# Patient Record
Sex: MALE | Race: WHITE | NOT HISPANIC OR LATINO | Employment: FULL TIME | URBAN - METROPOLITAN AREA
[De-identification: names, ages, dates, MRNs, and addresses within clinical notes are randomized per-mention and may not be internally consistent; named-entity substitution may affect disease eponyms.]

---

## 2018-10-17 ENCOUNTER — HISTORICAL (OUTPATIENT)
Dept: ADMINISTRATIVE | Facility: HOSPITAL | Age: 38
End: 2018-10-17

## 2018-10-17 LAB — H PYLORI AB SER IA-ACNC: NEGATIVE

## 2019-05-17 ENCOUNTER — HISTORICAL (OUTPATIENT)
Dept: ADMINISTRATIVE | Facility: HOSPITAL | Age: 39
End: 2019-05-17

## 2019-05-17 LAB
ABS NEUT (OLG): 4.1 X10(3)/MCL (ref 2.1–9.2)
ALBUMIN SERPL-MCNC: 4.3 GM/DL (ref 3.4–5)
ALBUMIN/GLOB SERPL: 1.54 {RATIO} (ref 1.5–2.5)
ALP SERPL-CCNC: 82 UNIT/L (ref 38–126)
ALT SERPL-CCNC: 53 UNIT/L (ref 7–52)
AST SERPL-CCNC: 35 UNIT/L (ref 15–37)
BILIRUB SERPL-MCNC: 0.8 MG/DL (ref 0.2–1)
BILIRUBIN DIRECT+TOT PNL SERPL-MCNC: 0.1 MG/DL (ref 0–0.5)
BILIRUBIN DIRECT+TOT PNL SERPL-MCNC: 0.7 MG/DL
BUN SERPL-MCNC: 10 MG/DL (ref 7–18)
CALCIUM SERPL-MCNC: 8.9 MG/DL (ref 8.5–10)
CHLORIDE SERPL-SCNC: 103 MMOL/L (ref 98–107)
CHOLEST SERPL-MCNC: 221 MG/DL (ref 0–200)
CHOLEST/HDLC SERPL: 4.2 {RATIO}
CO2 SERPL-SCNC: 27 MMOL/L (ref 21–32)
CREAT SERPL-MCNC: 0.87 MG/DL (ref 0.6–1.3)
ERYTHROCYTE [DISTWIDTH] IN BLOOD BY AUTOMATED COUNT: 13 % (ref 11.5–17)
GLOBULIN SER-MCNC: 2.8 GM/DL (ref 1.2–3)
GLUCOSE SERPL-MCNC: 100 MG/DL (ref 74–106)
HCT VFR BLD AUTO: 43.9 % (ref 42–52)
HDLC SERPL-MCNC: 52 MG/DL (ref 35–60)
HGB BLD-MCNC: 15 GM/DL (ref 14–18)
LDLC SERPL CALC-MCNC: 152 MG/DL (ref 0–129)
LYMPHOCYTES # BLD AUTO: 2.5 X10(3)/MCL (ref 0.6–3.4)
LYMPHOCYTES NFR BLD AUTO: 35.1 % (ref 13–40)
MCH RBC QN AUTO: 29.6 PG (ref 27–31.2)
MCHC RBC AUTO-ENTMCNC: 34 GM/DL (ref 32–36)
MCV RBC AUTO: 87 FL (ref 80–94)
MONOCYTES # BLD AUTO: 0.5 X10(3)/MCL (ref 0.1–1.3)
MONOCYTES NFR BLD AUTO: 6.4 % (ref 0.1–24)
NEUTROPHILS NFR BLD AUTO: 58.5 % (ref 47–80)
PLATELET # BLD AUTO: 232 X10(3)/MCL (ref 130–400)
PMV BLD AUTO: 9.8 FL (ref 9.4–12.4)
POTASSIUM SERPL-SCNC: 4.3 MMOL/L (ref 3.5–5.1)
PROT SERPL-MCNC: 7.1 GM/DL (ref 6.4–8.2)
RBC # BLD AUTO: 5.07 X10(6)/MCL (ref 4.7–6.1)
SODIUM SERPL-SCNC: 137 MMOL/L (ref 136–145)
TRIGL SERPL-MCNC: 116 MG/DL (ref 30–150)
TSH SERPL-ACNC: 1.01 MIU/ML (ref 0.35–4.94)
VLDLC SERPL CALC-MCNC: 23.2 MG/DL
WBC # SPEC AUTO: 7.1 X10(3)/MCL (ref 4.5–11.5)

## 2020-01-16 ENCOUNTER — HISTORICAL (OUTPATIENT)
Dept: ADMINISTRATIVE | Facility: HOSPITAL | Age: 40
End: 2020-01-16

## 2020-06-10 ENCOUNTER — HISTORICAL (OUTPATIENT)
Dept: ADMINISTRATIVE | Facility: HOSPITAL | Age: 40
End: 2020-06-10

## 2020-06-10 LAB
ABS NEUT (OLG): 3.7 X10(3)/MCL (ref 2.1–9.2)
ALBUMIN SERPL-MCNC: 4.5 GM/DL (ref 3.4–5)
ALBUMIN/GLOB SERPL: 1.55 {RATIO} (ref 1.5–2.5)
ALP SERPL-CCNC: 87 UNIT/L (ref 38–126)
ALT SERPL-CCNC: 25 UNIT/L (ref 7–52)
AST SERPL-CCNC: 20 UNIT/L (ref 15–37)
BILIRUB SERPL-MCNC: 0.6 MG/DL (ref 0.2–1)
BILIRUBIN DIRECT+TOT PNL SERPL-MCNC: 0.1 MG/DL (ref 0–0.5)
BILIRUBIN DIRECT+TOT PNL SERPL-MCNC: 0.5 MG/DL
BUN SERPL-MCNC: 11 MG/DL (ref 7–18)
CALCIUM SERPL-MCNC: 9.4 MG/DL (ref 8.5–10)
CHLORIDE SERPL-SCNC: 103 MMOL/L (ref 98–107)
CHOLEST SERPL-MCNC: 228 MG/DL (ref 0–200)
CHOLEST/HDLC SERPL: 5 {RATIO}
CO2 SERPL-SCNC: 27 MMOL/L (ref 21–32)
CREAT SERPL-MCNC: 0.84 MG/DL (ref 0.6–1.3)
ERYTHROCYTE [DISTWIDTH] IN BLOOD BY AUTOMATED COUNT: 12.4 % (ref 11.5–17)
GLOBULIN SER-MCNC: 2.9 GM/DL (ref 1.2–3)
GLUCOSE SERPL-MCNC: 97 MG/DL (ref 74–106)
HCT VFR BLD AUTO: 45.1 % (ref 42–52)
HDLC SERPL-MCNC: 46 MG/DL (ref 35–60)
HGB BLD-MCNC: 15.1 GM/DL (ref 14–18)
LDLC SERPL CALC-MCNC: 151 MG/DL (ref 0–129)
LYMPHOCYTES # BLD AUTO: 2.4 X10(3)/MCL (ref 0.6–3.4)
LYMPHOCYTES NFR BLD AUTO: 35.8 % (ref 13–40)
MCH RBC QN AUTO: 29 PG (ref 27–31.2)
MCHC RBC AUTO-ENTMCNC: 34 GM/DL (ref 32–36)
MCV RBC AUTO: 87 FL (ref 80–94)
MONOCYTES # BLD AUTO: 0.5 X10(3)/MCL (ref 0.1–1.3)
MONOCYTES NFR BLD AUTO: 7.5 % (ref 0.1–24)
NEUTROPHILS NFR BLD AUTO: 56.7 % (ref 47–80)
PLATELET # BLD AUTO: 274 X10(3)/MCL (ref 130–400)
PMV BLD AUTO: 9.7 FL (ref 9.4–12.4)
POTASSIUM SERPL-SCNC: 4.4 MMOL/L (ref 3.5–5.1)
PROT SERPL-MCNC: 7.4 GM/DL (ref 6.4–8.2)
RBC # BLD AUTO: 5.21 X10(6)/MCL (ref 4.7–6.1)
SODIUM SERPL-SCNC: 138 MMOL/L (ref 136–145)
TRIGL SERPL-MCNC: 151 MG/DL (ref 30–150)
TSH SERPL-ACNC: 0.85 MIU/ML (ref 0.35–4.94)
VLDLC SERPL CALC-MCNC: 30.2 MG/DL
WBC # SPEC AUTO: 6.6 X10(3)/MCL (ref 4.5–11.5)

## 2021-06-16 ENCOUNTER — HISTORICAL (OUTPATIENT)
Dept: ADMINISTRATIVE | Facility: HOSPITAL | Age: 41
End: 2021-06-16

## 2021-06-16 LAB
ABS NEUT (OLG): 3.9 X10(3)/MCL (ref 2.1–9.2)
ALBUMIN SERPL-MCNC: 4.3 GM/DL (ref 3.4–5)
ALBUMIN/GLOB SERPL: 1.48 {RATIO} (ref 1.5–2.5)
ALP SERPL-CCNC: 79 UNIT/L (ref 38–126)
ALT SERPL-CCNC: 23 UNIT/L (ref 7–52)
AST SERPL-CCNC: 21 UNIT/L (ref 15–37)
BILIRUB SERPL-MCNC: 0.6 MG/DL (ref 0.2–1)
BILIRUBIN DIRECT+TOT PNL SERPL-MCNC: 0.1 MG/DL (ref 0–0.5)
BILIRUBIN DIRECT+TOT PNL SERPL-MCNC: 0.5 MG/DL
BUN SERPL-MCNC: 9 MG/DL (ref 7–18)
CALCIUM SERPL-MCNC: 9.3 MG/DL (ref 8.5–10.1)
CHLORIDE SERPL-SCNC: 100 MMOL/L (ref 98–107)
CHOLEST SERPL-MCNC: 208 MG/DL (ref 0–200)
CHOLEST/HDLC SERPL: 4.2 {RATIO}
CO2 SERPL-SCNC: 28 MMOL/L (ref 21–32)
CREAT SERPL-MCNC: 0.84 MG/DL (ref 0.6–1.3)
ERYTHROCYTE [DISTWIDTH] IN BLOOD BY AUTOMATED COUNT: 12.2 % (ref 11.5–17)
GLOBULIN SER-MCNC: 2.9 GM/DL (ref 1.2–3)
GLUCOSE SERPL-MCNC: 92 MG/DL (ref 74–106)
HCT VFR BLD AUTO: 44.2 % (ref 42–52)
HDLC SERPL-MCNC: 49 MG/DL (ref 35–60)
HGB BLD-MCNC: 15 GM/DL (ref 14–18)
LDLC SERPL CALC-MCNC: 143 MG/DL (ref 0–129)
LYMPHOCYTES # BLD AUTO: 2.6 X10(3)/MCL (ref 0.6–3.4)
LYMPHOCYTES NFR BLD AUTO: 36.1 % (ref 13–40)
MCH RBC QN AUTO: 29.2 PG (ref 27–31.2)
MCHC RBC AUTO-ENTMCNC: 34 GM/DL (ref 32–36)
MCV RBC AUTO: 86 FL (ref 80–94)
MONOCYTES # BLD AUTO: 0.7 X10(3)/MCL (ref 0.1–1.3)
MONOCYTES NFR BLD AUTO: 9.8 % (ref 0.1–24)
NEUTROPHILS NFR BLD AUTO: 54.1 % (ref 47–80)
PLATELET # BLD AUTO: 248 X10(3)/MCL (ref 130–400)
PMV BLD AUTO: 9.9 FL (ref 9.4–12.4)
POTASSIUM SERPL-SCNC: 4.8 MMOL/L (ref 3.5–5.1)
PROT SERPL-MCNC: 7.2 GM/DL (ref 6.4–8.2)
RBC # BLD AUTO: 5.14 X10(6)/MCL (ref 4.7–6.1)
SODIUM SERPL-SCNC: 137 MMOL/L (ref 136–145)
TRIGL SERPL-MCNC: 110 MG/DL (ref 30–150)
TSH SERPL-ACNC: 0.7 MIU/ML (ref 0.35–4.94)
VLDLC SERPL CALC-MCNC: 22 MG/DL
WBC # SPEC AUTO: 7.2 X10(3)/MCL (ref 4.5–11.5)

## 2021-07-27 ENCOUNTER — HISTORICAL (OUTPATIENT)
Dept: ADMINISTRATIVE | Facility: HOSPITAL | Age: 41
End: 2021-07-27

## 2022-04-10 ENCOUNTER — HISTORICAL (OUTPATIENT)
Dept: ADMINISTRATIVE | Facility: HOSPITAL | Age: 42
End: 2022-04-10

## 2022-04-28 VITALS
HEIGHT: 70 IN | WEIGHT: 226.63 LBS | OXYGEN SATURATION: 97 % | SYSTOLIC BLOOD PRESSURE: 116 MMHG | DIASTOLIC BLOOD PRESSURE: 88 MMHG | BODY MASS INDEX: 32.45 KG/M2

## 2022-05-04 NOTE — HISTORICAL [facility] CERNER
This is a historical note converted from Pepper. Formatting and pictures may have been removed.  Please reference Pepper for original formatting and attached multimedia. Chief Complaint  RIGHT SHOULDER PAIN X 1 MONTH, NO INJURY  History of Present Illness  1 month ago pt developed right shoulder pain.? No injury.? Pt has treated with?massage therapy?which has helped symptoms temporarily. ?He recently?had 2 chiropractic treatments although?symptoms have worsened.  Burning pain at right upper thoracic?region. ?Denies any radicular symptoms, weakness or numbness.  Overall patient reports generalized anxiety disorder well-controlled with?Lexapro 10 mg daily although?occasional overwhelming anxiety.  Review of Systems  Constitutional:?No weight loss, no fever, no fatigue, no chills, no night sweats,?no weakness  Eyes:?No blurred vision,?no redness,?no drainage,?no ocular?pain  HEENT:?No sore throat,?no ear pain, no sinus pressure, no nasal congestion, no rhinorrhea, no postnasal drip  Respiratory:?No cough, no wheezing, no sputum production, no shortness of breath  Cardiovascular:?No chest pain, no palpitations, no dyspnea on exertion,?no orthopnea  Gastrointestinal:?No nausea, no vomiting, no abdominal pain, no diarrhea,?no constipation, no melena,?no hematochezia  Genitourinary:?No dysuria, no hematuria, no frequency, no urgency, no incontinence, no discharge  Musculoskeletal:? myalgias, arthralgias, no weakness, no joint effusion, no edema  Integumentary:?No rashes, no hives, no itching, no lesions, no jaundice  Neurologic:?No headaches, no numbness, no tingling, no weakness, no dizziness  Psychiatric:?anxiety, no irritability, no depression,?no suicidal ideations, no?homicidal ideations,?no delusions, no hallucinations  Endocrine:?No polyuria, no polydipsia, no polyphagia  Hematology:?No bruising, no lymphadenopathy,?no paleness  ?  Physical Exam  Vitals & Measurements  HR:?70(Peripheral)? BP:?124/92?  HT:?177.5?cm?  WT:?101.8?kg? BMI:?32.31?  General:?Well developed, Well-nourished, in No acute distress, A&O x 4  Cardiovascular:?Regular rate and rhythm, No murmurs, No gallops, No rubs  Respiratory:?Clear to auscultation bilaterally, No wheezes, No rhonchi, No?crackles  Abdomen:? Soft, Nontender, No hepatosplenomegaly, Normal and equal bowel sounds, No masses, No rebound, No guarding  Musculoskeletal:? tenderness right upper trapezius, FROM, No joint abnormality, No clubbing, No cyanosis,No?edema  Integumentary:?No rashes or skin lesions  Right?Shoulder -?Negative?Urias,?Negative?Neers,?Negative?Empty can, Strength?5/5,?Full?ROM, No pain with cross body adduction, No ttp over AC joint  Psychiatric:?Good insight,?appropriate thought process, normal affect,?appropriate?speech,  non-suicidal, cooperative, appropriate judgment  Assessment/Plan  1.?Strain of right trapezius muscle?S46.811A  ?Heat  Massage  Trial 10 mg at bedtime  Meloxicam 15 mg daily  Medrol Dosepak  Ordered:  Office/Outpatient Visit Level 3 Established [id number] PC, Strain of right trapezius muscle  Anxiety, [facility] - AFP, 01/16/20 14:12:00 CST  ?  2.?Anxiety?F41.9  ?Continue Lexapro 10 mg daily  Hydroxyzine?25 mg 4 times daily as needed for overwhelming anxiety  Ordered:  Office/Outpatient Visit Level 3 Established [id number] PC, Strain of right trapezius muscle  Anxiety, [facility] - AFP, 01/16/20 14:12:00 CST  ?  Orders:  cyclobenzaprine, 10 mg = 1 tab(s), Oral, At Bedtime, Use as needed for muscle spasm/pain, may cause sedation, # 20 tab(s), 0 Refill(s), Pharmacy: [facility] #[id number], 177.5, cm, Height/Length Dosing, 01/16/20 13:23:00 CST, 101.8, kg, Weight Dosing, 01/16/20 13...  hydrOXYzine, 25 mg = 1 tab(s), Oral, QID, PRN PRN for anxiety, # 30 tab(s), 0 Refill(s), Pharmacy: [facility] #[id number], 177.5, cm, Height/Length Dosing, 01/16/20 13:23:00 CST, 101.8, kg, Weight Dosing, 01/16/20 13:23:00 CST  meloxicam, 15 mg = 1 tab(s), Oral, Daily, # 30  tab(s), 0 Refill(s), Pharmacy: [facility] #[id number], 177.5, cm, Height/Length Dosing, 01/16/20 13:23:00 CST, 101.8, kg, Weight Dosing, 01/16/20 13:23:00 CST  methylPREDNISolone, = 1 packet(s), Oral, As Directed, as directed on package labeling, X 6 day(s), # 21 tab(s), 0 Refill(s), Pharmacy: [facility] #[id number], 177.5, cm, Height/Length Dosing, 01/16/20 13:23:00 CST, 101.8, kg, Weight Dosing, 01/16/20 13:23:00 CST   Problem List/Past Medical History  Ongoing  Anxiety  Depression  Insomnia  Left flank pain  Obesity  Historical  No qualifying data  Medications  cyclobenzaprine 10 mg oral tablet, 10 mg= 1 tab(s), Oral, At Bedtime  escitalopram 10 mg oral tablet, 10 mg= 1 tab(s), Oral, Daily, 3 refills  hydrOXYzine hydrochloride 25 mg oral tablet, 25 mg= 1 tab(s), Oral, QID, PRN  Medrol Dosepak 4 mg oral tablet, 1 packet(s), Oral, As Directed  meloxicam 15 mg oral tablet, 15 mg= 1 tab(s), Oral, Daily  traZODone 100 mg oral tablet, 100 mg= 1 tab(s), Oral, Once a day (at bedtime), 5 refills  Allergies  No Known Medication Allergies  Social History  Abuse/Neglect  No, 01/16/2020  Alcohol  Current, 02/18/2019  Employment/School  Employed, Work/School description: ., 05/17/2019  Tobacco  Former smoker, quit more than 30 days ago, N/A, 01/16/2020  Former smoker, quit more than 30 days ago, N/A, 05/17/2019  Former smoker, quit more than 30 days ago, N/A, 02/18/2019  Family History  Depression.: Mother.  Diabetes mellitus type 2: Father.  Hypertension.: Father.  Immunizations  Vaccine Date Status   tetanus/diphtheria/pertussis, acel(Tdap) 05/17/2019 Given   influenza virus vaccine, inactivated 10/17/2018 Given   Health Maintenance  Health Maintenance  ???Pending?(in the next year)  ??? ??Due?  ??? ? ? ?Alcohol Misuse Screening due??01/01/20??and every 1??year(s)  ??? ? ? ?ADL Screening due??01/16/20??and every 1??year(s)  ??? ??Due In Future?  ??? ? ? ?Obesity Screening not due until??01/01/21??and every  1??year(s)  ??? ? ? ?Blood Pressure Screening not due until??01/15/21??and every 1??year(s)  ??? ? ? ?Body Mass Index Check not due until??01/15/21??and every 1??year(s)  ???Satisfied?(in the past 1 year)  ??? ??Satisfied?  ??? ? ? ?Blood Pressure Screening on??01/16/20.??Satisfied by Eliza Dunlap LPN  ??? ? ? ?Body Mass Index Check on??01/16/20.??Satisfied by Eliza Dunlap LPN  ??? ? ? ?Influenza Vaccine on??01/16/20.??Satisfied by Eliza Dunlap LPN  ??? ? ? ?Lipid Screening on??05/17/19.??Satisfied by Drew Raymond  ??? ? ? ?Obesity Screening on??01/16/20.??Satisfied by Eliza Dunlap LPN  ??? ? ? ?Tetanus Vaccine on??05/17/19.??Satisfied by Eliza Dunlap LPN  ?  Diagnostic Results  (01/16/2020 13:38 CST XR Shoulder Right Minimum 2 Views)  Radiology Report  Indications:  ?  Shoulder pain  ?  RIGHT ?SHOULDER, 2 VIEWS:  ?  Internal and external rotational views were obtained.  ?  No evidence of fracture, dislocation or lytic lesion.  Normal alignment of the humeral head and glenoid.  No abnormal soft tissue calcifications. Normal bone mineralization.  No abnormality of the upper bony thorax.  Normal a.c. joint with no significant degenerative changes.  ?  IMPRESSION: ? ?NORMAL STUDY.  ?  ?  ?  Signature Line  Electronically Signed By: Reji Bonner MD  Date/Time Signed: 01/16/2020 15:44 [1]     [1]?XR Shoulder Right Minimum 2 Views; Reji Bonner MD 01/16/2020 13:38 CST